# Patient Record
Sex: MALE | Race: WHITE | URBAN - METROPOLITAN AREA
[De-identification: names, ages, dates, MRNs, and addresses within clinical notes are randomized per-mention and may not be internally consistent; named-entity substitution may affect disease eponyms.]

---

## 2017-03-28 ENCOUNTER — HISTORICAL (OUTPATIENT)
Dept: ADMINISTRATIVE | Facility: HOSPITAL | Age: 62
End: 2017-03-28

## 2017-04-05 ENCOUNTER — HISTORICAL (OUTPATIENT)
Dept: ADMINISTRATIVE | Facility: HOSPITAL | Age: 62
End: 2017-04-05

## 2017-04-12 ENCOUNTER — HISTORICAL (OUTPATIENT)
Dept: ADMINISTRATIVE | Facility: HOSPITAL | Age: 62
End: 2017-04-12

## 2017-04-12 ENCOUNTER — HISTORICAL (OUTPATIENT)
Dept: PREADMISSION TESTING | Facility: HOSPITAL | Age: 62
End: 2017-04-12

## 2017-04-13 ENCOUNTER — HISTORICAL (OUTPATIENT)
Dept: SURGERY | Facility: HOSPITAL | Age: 62
End: 2017-04-13

## 2017-04-19 ENCOUNTER — HISTORICAL (OUTPATIENT)
Dept: ADMINISTRATIVE | Facility: HOSPITAL | Age: 62
End: 2017-04-19

## 2017-05-04 ENCOUNTER — HISTORICAL (OUTPATIENT)
Dept: ADMINISTRATIVE | Facility: HOSPITAL | Age: 62
End: 2017-05-04

## 2017-05-09 ENCOUNTER — HISTORICAL (OUTPATIENT)
Dept: ADMINISTRATIVE | Facility: HOSPITAL | Age: 62
End: 2017-05-09

## 2017-05-23 ENCOUNTER — HISTORICAL (OUTPATIENT)
Dept: ADMINISTRATIVE | Facility: HOSPITAL | Age: 62
End: 2017-05-23

## 2017-06-06 ENCOUNTER — HISTORICAL (OUTPATIENT)
Dept: ADMINISTRATIVE | Facility: HOSPITAL | Age: 62
End: 2017-06-06

## 2022-04-10 ENCOUNTER — HISTORICAL (OUTPATIENT)
Dept: ADMINISTRATIVE | Facility: HOSPITAL | Age: 67
End: 2022-04-10

## 2022-04-29 VITALS
SYSTOLIC BLOOD PRESSURE: 109 MMHG | WEIGHT: 158.56 LBS | BODY MASS INDEX: 24.02 KG/M2 | WEIGHT: 158.31 LBS | DIASTOLIC BLOOD PRESSURE: 70 MMHG | DIASTOLIC BLOOD PRESSURE: 71 MMHG | HEIGHT: 68 IN | DIASTOLIC BLOOD PRESSURE: 68 MMHG | HEIGHT: 68 IN | DIASTOLIC BLOOD PRESSURE: 74 MMHG | WEIGHT: 158.5 LBS | SYSTOLIC BLOOD PRESSURE: 114 MMHG | SYSTOLIC BLOOD PRESSURE: 112 MMHG | WEIGHT: 158.5 LBS | BODY MASS INDEX: 23.99 KG/M2 | DIASTOLIC BLOOD PRESSURE: 76 MMHG | SYSTOLIC BLOOD PRESSURE: 106 MMHG | SYSTOLIC BLOOD PRESSURE: 114 MMHG | HEIGHT: 68 IN | HEIGHT: 68 IN | WEIGHT: 158.5 LBS | BODY MASS INDEX: 24.11 KG/M2 | BODY MASS INDEX: 24.02 KG/M2 | BODY MASS INDEX: 24.02 KG/M2

## 2022-05-02 NOTE — HISTORICAL [facility]ER
This is a historical note converted from Ruperto. Formatting and pictures may have been removed.  Please reference Ruperto for original formatting and attached multimedia. Chief Complaint  f/u right clavicle  History of Present Illness  This 61-year-old is here for follow-up of his right clavicle.? The patient is now?5 weeks out from surgery.? However he had an episode where he fell?and had to extend his right arm. ?He felt a pop in his shoulder. ?Since that time he has had difficulty raising his arm.? He is accompanied by his wife.  Review of Systems  Constitutional: No fever, weakness, or fatigue.  Ear/Nose/Mouth/Throat: No nasal congestion or sore throat.  Respiratory: No shortness of breath or cough.  Cardiovascular: No chest pain, palpitations, or peripheral edema.  Gastrointestinal: No nausea, vomiting, or abdominal pain.  Genitourinary: No dysuria.  Musculoskeletal: See current complaints  Integumentary: Negative.  Physical Exam  Vitals & Measurements  HR:?61?(Peripheral)? BP:?114/71? HT:?172.72?cm? HT:?172.72?cm? WT:?71.9?kg? WT:?71.9?kg? BMI:?24.1?  Physical examination shows that the patient has no tenderness at his clavicle. ?His alignment appears to be unchanged. ?He is motor and sensory intact.? However he has lost significant function in his rotator cuff.? Forward flexion is 40? and abduction is 40?.? He has weakness in abduction and external rotation.  ?  AP and lateral of the right clavicle shows that the patients fracture reduction is maintained and his hardware position is maintained.  Assessment/Plan  1.?Fracture of acromial end of right clavicle  The findings were reviewed with the patient and he expressed understanding.? The possibility of a rotator cuff tear was discussed with the patient and his wife.? He declined an injection today.? He would like to resume self-directed therapy. ?I will see him back in 2 weeks with new x-rays.? The patient?will be reevaluated at that point. ?If he persists  with weakness we will need to discuss rotator cuff surgery.? He is instructed to call if he has any problems prior to his next appointment.  Ordered:  Clinic Follow up, *Est. 06/06/17 9:00:00 CDT, Order for future visit, Orthopedic aftercare, [facility] [place]  Post-Op follow-up visit [id number] PC, Fracture of acromial end of right clavicle  Orthopedic aftercare  Sprain of right rotator cuff capsule, [facility] [place], 05/23/17 8:48:00 CDT  ?  2.?Orthopedic aftercare  Ordered:  Clinic Follow up, *Est. 06/06/17 9:00:00 CDT, Order for future visit, Orthopedic aftercare, [facility] [place]  Post-Op follow-up visit [id number] PC, Fracture of acromial end of right clavicle  Orthopedic aftercare  Sprain of right rotator cuff capsule, [facility] [place], 05/23/17 8:48:00 CDT  ?  3.?Sprain of right rotator cuff capsule  Ordered:  Clinic Follow up, *Est. 06/06/17 9:00:00 CDT, Order for future visit, Orthopedic aftercare, [facility] [place]  Post-Op follow-up visit [id number] PC, Fracture of acromial end of right clavicle  Orthopedic aftercare  Sprain of right rotator cuff capsule, [facility] [place], 05/23/17 8:48:00 CDT  ?   Problem List/Past Medical History  BPH (benign prostatic hypertrophy)  Colles fracture of right radius  Contusion of right hip  Fracture of acromial end of right clavicle  Fracture of right distal radius  GERD (gastroesophageal reflux disease)  Hearing reduced  Inguinal hernia  Orthopedic aftercare  Pain in right hip  Seasonal allergies  Sprain of right rotator cuff capsule  Staph infection  Historical  No historical problems  Procedure/Surgical History  Open treatment of clavicular fracture, includes internal fixation, when performed (04/13/2017), ORIF Clavicle (Right) (04/13/2017), Reposition Right Clavicle with Internal Fixation Device, Open Approach (04/13/2017), Closed treatment of distal radial fracture (eg, Colles or Smith type) or epiphyseal separation, includes closed treatment of fracture of  ulnar styloid, when performed; with manipulation (10/13/2016), Reposition Right Radius, External Approach (10/13/2016), Hernia Repair Inguinal (Left) (02/02/2015), Other and open repair of indirect inguinal hernia with graft or prosthesis (02/02/2015), Repair initial inguinal hernia, age 5 years or older; reducible. (02/02/2015), Boil, Colonoscopy, sinus surgery, vocal cord surgery.  Medications  calcium (as calcium citrate) 250 mg oral tablet, 1 capsule, Oral, Daily  magnesium 50 mg oral tablet  multivitamin with minerals (Adult Tab), 1 tab(s), Oral, Daily  Omega-3 oral capsule, 1 capsule, Oral, Daily  Probiotic Formula oral capsule, 1 capsule, Oral, Daily  Allergies  No Known Medication Allergies  Social History  Alcohol - Denies Alcohol Use  Past, Wine  Past, Wine, 1-2 times per year  Employment/School  Employed  Home/Environment  Lives with Spouse.  Substance Abuse - Denies Substance Abuse  Never  Never  Tobacco - Denies Tobacco Use  Never smoker  Never smoker  Family History  Diabetes mellitus type 2: Mother and Brother.  Hypertension.: Mother and Brother.  Leukemia: Brother.  Metastatic cancer: Grandfather.

## 2022-05-02 NOTE — HISTORICAL [facility]
This is a historical note converted from Ruperto. Formatting and pictures may have been removed.  Please reference Ruperto for original formatting and attached multimedia. Chief Complaint  f/u right clavicle  History of Present Illness  This 61-year-old gentleman is here for follow-up of his right shoulder.? He is now 7 weeks out from surgery. ?He is 2 weeks out from his most recent fall.? He states that he is still not regained active range of motion since I last saw him. ?He has been working diligently on self-directed therapy.  Review of Systems  Constitutional: No fever, weakness, or fatigue.  Ear/Nose/Mouth/Throat: No nasal congestion or sore throat.  Respiratory: No shortness of breath or cough.  Cardiovascular: No chest pain, palpitations, or peripheral edema.  Gastrointestinal: No nausea, vomiting, or abdominal pain.  Genitourinary: No dysuria.  Musculoskeletal: See current complaints  Integumentary: Negative.??  Physical Exam  Vitals & Measurements  HR:?54?(Peripheral)? BP:?109/70? HT:?172.72?cm? WT:?71.917?kg? WT:?71.9?kg?  Physical examination shows that the patient has full passive range of motion.? He has no pain at his fracture site. ?He has no motion at his fracture site.? However active range of motion is limited to forward flexion of 40? and abduction of 40?.? Extension is 60? and internal rotation is to L5.? External rotation is 25?.  ?  AP and serendipity view of the right clavicle shows no change in hardware position. ?His fracture appears to be healing uneventfully.  Assessment/Plan  1.?Fracture of acromial end of right clavicle  The findings were reviewed with the patient and he expressed understanding.? The patient understands that he may have a rotator cuff tear.? He understands that he cannot have an MRI. ?If diagnostic studies?are ordered it would be a CT arthrogram. ?The patient would like to try to continue with conservative self-directed therapy.? He would like to be seen back in 6 weeks.  ?I will obtain new x-rays at that time. ?If he still persist with?poor active range of motion surgery should be considered.? He is instructed to call if he has any problems prior to that appointment.  Ordered:  Clinic Follow up, *Est. 07/18/17 15:45:00 CDT, Order for future visit, Orthopedic aftercare, [facility] [place]  Post-Op follow-up visit [id number] PC, Fracture of acromial end of right clavicle  Orthopedic aftercare  Sprain of right rotator cuff capsule, [facility] [place], 06/06/17 9:34:00 CDT  XR Clavicle Right, Routine, *Est. 07/18/17 3:00:00 CDT, Trauma, None, Stretcher, Patient Has IV?, Rad Type, Order for future visit, Fracture of acromial end of right clavicle  Orthopedic aftercare  Sprain of right rotator cuff capsule, Not Scheduled, 6, week(s), In Ap...  ?  2.?Orthopedic aftercare  Ordered:  Clinic Follow up, *Est. 07/18/17 15:45:00 CDT, Order for future visit, Orthopedic aftercare, [facility] [place]  Post-Op follow-up visit [id number] PC, Fracture of acromial end of right clavicle  Orthopedic aftercare  Sprain of right rotator cuff capsule, [facility] [place], 06/06/17 9:34:00 CDT  XR Clavicle Right, Routine, *Est. 07/18/17 3:00:00 CDT, Trauma, None, Stretcher, Patient Has IV?, Rad Type, Order for future visit, Fracture of acromial end of right clavicle  Orthopedic aftercare  Sprain of right rotator cuff capsule, Not Scheduled, 6, week(s), In Ap...  ?  3.?Sprain of right rotator cuff capsule  Ordered:  Clinic Follow up, *Est. 07/18/17 15:45:00 CDT, Order for future visit, Orthopedic aftercare, [facility] [place]  Post-Op follow-up visit [id number] PC, Fracture of acromial end of right clavicle  Orthopedic aftercare  Sprain of right rotator cuff capsule, [facility] [place], 06/06/17 9:34:00 CDT  XR Clavicle Right, Routine, *Est. 07/18/17 3:00:00 CDT, Trauma, None, Stretcher, Patient Has IV?, Rad Type, Order for future visit, Fracture of acromial end of right clavicle  Orthopedic aftercare   Sprain of right rotator cuff capsule, Not Scheduled, 6, week(s), In Ap...  ?   Problem List/Past Medical History  BPH (benign prostatic hypertrophy)  Colles fracture of right radius  Contusion of right hip  Fracture of acromial end of right clavicle  Fracture of right distal radius  GERD (gastroesophageal reflux disease)  Hearing reduced  Inguinal hernia  Orthopedic aftercare  Pain in right hip  Seasonal allergies  Sprain of right rotator cuff capsule  Staph infection  Historical  No historical problems  Procedure/Surgical History  Open treatment of clavicular fracture, includes internal fixation, when performed (04/13/2017), ORIF Clavicle (Right) (04/13/2017), Reposition Right Clavicle with Internal Fixation Device, Open Approach (04/13/2017), Closed treatment of distal radial fracture (eg, Colles or Smith type) or epiphyseal separation, includes closed treatment of fracture of ulnar styloid, when performed; with manipulation (10/13/2016), Reposition Right Radius, External Approach (10/13/2016), Hernia Repair Inguinal (Left) (02/02/2015), Other and open repair of indirect inguinal hernia with graft or prosthesis (02/02/2015), Repair initial inguinal hernia, age 5 years or older; reducible. (02/02/2015), Boil, Colonoscopy, sinus surgery, vocal cord surgery.  Medications  calcium (as calcium citrate) 250 mg oral tablet, 1 capsule, Oral, Daily  magnesium 50 mg oral tablet  multivitamin with minerals (Adult Tab), 1 tab(s), Oral, Daily  Omega-3 oral capsule, 1 capsule, Oral, Daily  Probiotic Formula oral capsule, 1 capsule, Oral, Daily  Allergies  No Known Medication Allergies  Social History  Alcohol - Denies Alcohol Use  Past, Wine  Past, Wine, 1-2 times per year  Employment/School  Employed  Home/Environment  Lives with Spouse.  Substance Abuse - Denies Substance Abuse  Never  Never  Tobacco - Denies Tobacco Use  Never smoker  Never smoker  Family History  Diabetes mellitus type 2: Mother and Brother.  Hypertension.:  Mother and Brother.  Leukemia: Brother.  Metastatic cancer: Grandfather.

## 2022-05-02 NOTE — HISTORICAL [facility]
This is a historical note converted from Ruperto. Formatting and pictures may have been removed.  Please reference Ruperto for original formatting and attached multimedia. Chief Complaint  RT clavicle  History of Present Illness  Patient comes in today for his first visit. ?He is present here with his wife. ?Patient is approximately?3 weeks from his open reduction internal fixation of his right distal clavicle fracture. ?Patient denies any new complaints though he is having some burning sensation, occasionally sticking sensation in his clavicle area. ?He states it is been going on for the last week. ?He denies any new trauma. ?He is occasionally taking pain medications. ?He has been using his sling.? He feels hesitant to use his right shoulder at times. ?He denies any radiculopathy numbness or tingling he denies any other complaints.  Physical Exam  Vitals & Measurements  BP:?114/68? HT:?172.72?cm? HT:?172.72?cm? WT:?71.9?kg? WT:?71.9?kg? BMI:?24.1?  Right upper extremity compartments are soft and warm. ?Skin is intact. ?There is no signs or symptoms of DVT or infection. ?His incision is well-healed. ?There is no prominent hardware. ?He is tender along the distal aspect?about his AC joint area.? He does have appropriate passive range of motion of the right shoulder. ?There is no crepitance there is no obvious?instability.? He is nontender elsewhere about the elbow wrist and hand. ?He is neurovascular intact distally.??X-rays 2 views of his right shoulder demonstrate a well aligned?clavicle fracture with hardware in appropriate position  Assessment/Plan  1.?Fracture of acromial end of right clavicle  At this time we discussed his physical exam and x-ray findings. ?We have discussed coming out of his sling 3-4 times a day for pendulum exercises. ?He will refrain from any heavy lifting. ?We have discussed physical therapy?for his right shoulder. ?He will follow-up with?Dr. León?next week.? Patient understands to call  or return sooner if there is any new problems or difficulties.  Pain due to internal orthopedic prosthetic devices, implants and grafts   Problem List/Past Medical History  BPH (benign prostatic hypertrophy)  Colles fracture of right radius  Contusion of right hip  Fracture of acromial end of right clavicle  Fracture of right distal radius  GERD (gastroesophageal reflux disease)  Hearing reduced  Inguinal hernia  Pain in right hip  Seasonal allergies  Staph infection  Historical  No historical problems  Procedure/Surgical History  Open treatment of clavicular fracture, includes internal fixation, when performed (04/13/2017), ORIF Clavicle (Right) (04/13/2017), Reposition Right Clavicle with Internal Fixation Device, Open Approach (04/13/2017), Closed treatment of distal radial fracture (eg, Colles or Smith type) or epiphyseal separation, includes closed treatment of fracture of ulnar styloid, when performed; with manipulation (10/13/2016), Reposition Right Radius, External Approach (10/13/2016), Hernia Repair Inguinal (Left) (02/02/2015), Other and open repair of indirect inguinal hernia with graft or prosthesis (02/02/2015), Repair initial inguinal hernia, age 5 years or older; reducible. (02/02/2015), Boil, Colonoscopy, sinus surgery, vocal cord surgery.  Medications  calcium (as calcium citrate) 250 mg oral tablet, 1 capsule, Oral, Daily  magnesium 50 mg oral tablet  multivitamin with minerals (Adult Tab), 1 tab(s), Oral, Daily  Omega-3 oral capsule, 1 capsule, Oral, Daily  Probiotic Formula oral capsule, 1 capsule, Oral, Daily  Allergies  No Known Medication Allergies  Social History  Alcohol - Denies Alcohol Use  Past, Wine  Past, Wine, 1-2 times per year  Employment/School  Employed  Home/Environment  Lives with Spouse.  Substance Abuse - Denies Substance Abuse  Never  Never  Tobacco - Denies Tobacco Use  Never smoker  Never smoker  Family History  Diabetes mellitus type 2: Mother and Brother.  Hypertension.:  Mother and Brother.  Leukemia: Brother.  Metastatic cancer: Grandfather.

## 2022-05-02 NOTE — HISTORICAL [facility] C[facility]
This is a historical note converted from Ruperto. Formatting and pictures may have been removed.  Please reference Ruperto for original formatting and attached multimedia. Chief Complaint  Post Op ORIF right clavicle  History of Present Illness  This 61-year-old gentleman is here for follow-up of his right clavicle.? He is now 4 weeks out from surgery.? He is complaining of minimal pain.  Review of Systems  Constitutional: No fever, weakness, or fatigue.  Ear/Nose/Mouth/Throat: No nasal congestion or sore throat.  Respiratory: No shortness of breath or cough.  Cardiovascular: No chest pain, palpitations, or peripheral edema.  Gastrointestinal: No nausea, vomiting, or abdominal pain.  Genitourinary: No dysuria.  Musculoskeletal: See current complaints  Integumentary: Negative.  Physical Exam  Vitals & Measurements  HR:?69?(Peripheral)? BP:?112/76? HT:?172.72?cm? HT:?172.72?cm? WT:?71.9?kg? WT:?71.9?kg? BMI:?24.1?  Physical examination shows that his wound is healed uneventfully.? He has no pain at his fracture site.? His alignment appears maintained.? Active range of motion is tested today.? Forward flexion is 110? and abduction is 120?.? Extension is 60? and internal rotation is to L5.? He is motor and sensory intact.  ?  AP and serendipity view of the right clavicle shows that his fracture is healing in unchanged position.? I do not see a significant amount of callus formation.? There is been no change in his hardware position.  Assessment/Plan  1.?Fracture of acromial end of right clavicle  The findings were reviewed with the patient and he expressed understanding.? The patient can now do gentle active range of motion but no lifting.? He does not have to wear his sling when he is in bed or sitting in a chair.? I will see him back in 2 weeks with new x-rays.? He requested no pain medication.? He is instructed to call if he has any problems prior to his next appointment.  Ordered:  Clinic Follow up, *Est. 05/23/17  8:30:00 CDT  Post-Op follow-up visit [id number] PC, Fracture of acromial end of right clavicle  Orthopedic aftercare, [facility] [place], 05/09/17 16:02:00 CDT  Shoulder Estefany RT  PC, 05/09/17 16:03:00 CDT, [facility] [place], Routine, 05/09/17 16:03:00 CDT  XR Clavicle Right, Routine, *Est. 05/23/17 3:00:00 CDT, Trauma, None, Ambulatory, Patient Has IV?, Rad Type, Order for future visit, Fracture of acromial end of right clavicle  Orthopedic aftercare, Not Scheduled, 2, week(s), In Approximately, *Est. 05/23/17 3:00:00 CDT  ?  2.?Orthopedic aftercare  Ordered:  Clinic Follow up, *Est. 05/23/17 8:30:00 CDT  Post-Op follow-up visit [id number] PC, Fracture of acromial end of right clavicle  Orthopedic aftercare, [facility] [place], 05/09/17 16:02:00 CDT  Shoulder Estefany RT  PC, 05/09/17 16:03:00 CDT, [facility] [place], Routine, 05/09/17 16:03:00 CDT  XR Clavicle Right, Routine, *Est. 05/23/17 3:00:00 CDT, Trauma, None, Ambulatory, Patient Has IV?, Rad Type, Order for future visit, Fracture of acromial end of right clavicle  Orthopedic aftercare, Not Scheduled, 2, week(s), In Approximately, *Est. 05/23/17 3:00:00 CDT  ?   Problem List/Past Medical History  BPH (benign prostatic hypertrophy)  Colles fracture of right radius  Contusion of right hip  Fracture of acromial end of right clavicle  Fracture of right distal radius  GERD (gastroesophageal reflux disease)  Hearing reduced  Inguinal hernia  Orthopedic aftercare  Pain in right hip  Seasonal allergies  Staph infection  Historical  No historical problems  Procedure/Surgical History  Open treatment of clavicular fracture, includes internal fixation, when performed (04/13/2017), ORIF Clavicle (Right) (04/13/2017), Reposition Right Clavicle with Internal Fixation Device, Open Approach (04/13/2017), Closed treatment of distal radial fracture (eg, Colles or Smith type) or epiphyseal separation, includes closed treatment of fracture of ulnar styloid,  when performed; with manipulation (10/13/2016), Reposition Right Radius, External Approach (10/13/2016), Hernia Repair Inguinal (Left) (02/02/2015), Other and open repair of indirect inguinal hernia with graft or prosthesis (02/02/2015), Repair initial inguinal hernia, age 5 years or older; reducible. (02/02/2015), Boil, Colonoscopy, sinus surgery, vocal cord surgery.  Medications  calcium (as calcium citrate) 250 mg oral tablet, 1 capsule, Oral, Daily  magnesium 50 mg oral tablet  multivitamin with minerals (Adult Tab), 1 tab(s), Oral, Daily  Omega-3 oral capsule, 1 capsule, Oral, Daily  Probiotic Formula oral capsule, 1 capsule, Oral, Daily  Allergies  No Known Medication Allergies  Social History  Alcohol - Denies Alcohol Use  Past, Wine  Past, Wine, 1-2 times per year  Employment/School  Employed  Home/Environment  Lives with Spouse.  Substance Abuse - Denies Substance Abuse  Never  Never  Tobacco - Denies Tobacco Use  Never smoker  Never smoker  Family History  Diabetes mellitus type 2: Mother and Brother.  Hypertension.: Mother and Brother.  Leukemia: Brother.  Metastatic cancer: Grandfather.